# Patient Record
Sex: MALE
[De-identification: names, ages, dates, MRNs, and addresses within clinical notes are randomized per-mention and may not be internally consistent; named-entity substitution may affect disease eponyms.]

---

## 2023-11-29 ENCOUNTER — DASHBOARD ENCOUNTERS (OUTPATIENT)
Age: 23
End: 2023-11-29

## 2023-12-01 ENCOUNTER — OFFICE VISIT (OUTPATIENT)
Dept: URBAN - METROPOLITAN AREA CLINIC 82 | Facility: CLINIC | Age: 23
End: 2023-12-01
Payer: MEDICAID

## 2023-12-01 VITALS
HEIGHT: 65 IN | BODY MASS INDEX: 24.96 KG/M2 | SYSTOLIC BLOOD PRESSURE: 116 MMHG | TEMPERATURE: 98.6 F | HEART RATE: 68 BPM | DIASTOLIC BLOOD PRESSURE: 77 MMHG | WEIGHT: 149.8 LBS

## 2023-12-01 DIAGNOSIS — K62.89 RECTAL PAIN: ICD-10-CM

## 2023-12-01 DIAGNOSIS — K59.09 CHRONIC CONSTIPATION: ICD-10-CM

## 2023-12-01 PROCEDURE — 99204 OFFICE O/P NEW MOD 45 MIN: CPT | Performed by: STUDENT IN AN ORGANIZED HEALTH CARE EDUCATION/TRAINING PROGRAM

## 2023-12-01 NOTE — PHYSICAL EXAM GASTROINTESTINAL
Abdomen , soft, nontender, nondistended , no guarding or rigidity , no masses palpable; Rectal exam: pt denied.

## 2023-12-01 NOTE — HPI-TODAY'S VISIT:
24 y/o M presents with c/o intermittent rectal pain and constipation over the past 1-2 yrs. Went to [facility] ER in October for this complaint, received a KUB with moderate stool burdern; denied rectal exam at that time. Discharged on colace which did not relieve sx and still going once a week.  At this visit. patient reports pain feels like something is stuck in the rectum or like bubbles. Does not worsen with eating or BMs.  States the pain goes away sometimes after drinking Boost. States he has been constipated (BMs once a week) for ~1-2 yrs. Denies abdominal pain, n/v, blood in stool, anal intercourse. Not sexually active. Reports 10lb weight loss over the past year unintentionally.Tried Dulcolax before as well, without relief. Diet consists of chicken patties and burgers, usually; states he has adequate water intake. No fhx of any GI malignancy.

## 2024-01-10 ENCOUNTER — OFFICE VISIT (OUTPATIENT)
Dept: URBAN - METROPOLITAN AREA CLINIC 82 | Facility: CLINIC | Age: 24
End: 2024-01-10